# Patient Record
Sex: MALE
[De-identification: names, ages, dates, MRNs, and addresses within clinical notes are randomized per-mention and may not be internally consistent; named-entity substitution may affect disease eponyms.]

---

## 2022-06-17 ENCOUNTER — APPOINTMENT (OUTPATIENT)
Dept: ORTHOPEDIC SURGERY | Facility: CLINIC | Age: 47
End: 2022-06-17
Payer: COMMERCIAL

## 2022-06-17 VITALS — HEIGHT: 71 IN | BODY MASS INDEX: 28.42 KG/M2 | WEIGHT: 203 LBS

## 2022-06-17 DIAGNOSIS — M79.644 PAIN IN RIGHT FINGER(S): ICD-10-CM

## 2022-06-17 DIAGNOSIS — Z78.9 OTHER SPECIFIED HEALTH STATUS: ICD-10-CM

## 2022-06-17 PROBLEM — Z00.00 ENCOUNTER FOR PREVENTIVE HEALTH EXAMINATION: Status: ACTIVE | Noted: 2022-06-17

## 2022-06-17 PROCEDURE — 73140 X-RAY EXAM OF FINGER(S): CPT | Mod: RT

## 2022-06-17 PROCEDURE — 99203 OFFICE O/P NEW LOW 30 MIN: CPT

## 2022-06-17 NOTE — PHYSICAL EXAM
[MCP Joint] : MCP joint [2nd] : 2nd [Finger Flexion] : finger flexion [Right] : right fingers [de-identified] : tenderness over index RCL origin  [FreeTextEntry9] : minimal degenerative changes

## 2022-06-17 NOTE — HISTORY OF PRESENT ILLNESS
[Sudden] : sudden [0] : 0 [Sharp] : sharp [Occasional] : occasional [de-identified] : 46 year old male presents with a RIGHt hand index finger MP pain. Pt was picking up bags of dirt 2 weeks ago and started feeling pain in the hand the next day. But no specific injury occurred.  [] : no [FreeTextEntry1] : right index finger  [FreeTextEntry3] : 6/1/22  [FreeTextEntry5] : Patient was injured while picking up a bag of dirt. Having pain if he bends finger back

## 2022-07-08 ENCOUNTER — APPOINTMENT (OUTPATIENT)
Dept: ORTHOPEDIC SURGERY | Facility: CLINIC | Age: 47
End: 2022-07-08

## 2022-07-08 VITALS — HEIGHT: 71 IN | WEIGHT: 202 LBS | BODY MASS INDEX: 28.28 KG/M2

## 2022-07-08 DIAGNOSIS — S63.650A SPRAIN OF METACARPOPHALANGEAL JOINT OF RIGHT INDEX FINGER, INITIAL ENCOUNTER: ICD-10-CM

## 2022-07-08 PROCEDURE — 99213 OFFICE O/P EST LOW 20 MIN: CPT

## 2022-07-08 NOTE — PHYSICAL EXAM
[Right] : right hand [FreeTextEntry3] : Decreases swelling and tenderness [FreeTextEntry9] : near full fist

## 2022-07-08 NOTE — HISTORY OF PRESENT ILLNESS
[3] : 3 [0] : 0 [Dull/Aching] : dull/aching [Sharp] : sharp [Intermittent] : intermittent [de-identified] : 46 year old mlae followed for RIGHT index MP synovitis.  Has been using NSAIDs, strap, and warm compresses.  Reports improvement , but not resolution.\par DOI: 6/1/22  [FreeTextEntry1] : RT hand [] : no

## 2022-07-29 ENCOUNTER — APPOINTMENT (OUTPATIENT)
Dept: ORTHOPEDIC SURGERY | Facility: CLINIC | Age: 47
End: 2022-07-29

## 2022-07-29 VITALS — HEIGHT: 71 IN | WEIGHT: 202 LBS | BODY MASS INDEX: 28.28 KG/M2

## 2022-07-29 PROCEDURE — 99213 OFFICE O/P EST LOW 20 MIN: CPT

## 2022-07-29 NOTE — PHYSICAL EXAM
[Right] : right hand [] : no instability w/varus/valgus or ant/post stressing of fingers joints [FreeTextEntry9] : all fingers reach DPC, full fist

## 2022-07-29 NOTE — HISTORY OF PRESENT ILLNESS
[2] : 2 [0] : 0 [Dull/Aching] : dull/aching [Sharp] : sharp [Intermittent] : intermittent [de-identified] : 46 year old male being followed for Sprain of metacarpophalangeal (MCP) joint of right index finger and Synovitis of finger. Has been aniya strapping. Is OOW. Reports improvement in his symptoms. \par DOI: 6/1/22  [] : no [FreeTextEntry1] : RT hand

## 2022-09-02 ENCOUNTER — APPOINTMENT (OUTPATIENT)
Dept: ORTHOPEDIC SURGERY | Facility: CLINIC | Age: 47
End: 2022-09-02

## 2022-09-02 VITALS — HEIGHT: 71 IN | WEIGHT: 202 LBS | BODY MASS INDEX: 28.28 KG/M2

## 2022-09-02 DIAGNOSIS — S63.650D SPRAIN OF METACARPOPHALANGEAL JOINT OF RIGHT INDEX FINGER, SUBSEQUENT ENCOUNTER: ICD-10-CM

## 2022-09-02 DIAGNOSIS — M65.9 SYNOVITIS AND TENOSYNOVITIS, UNSPECIFIED: ICD-10-CM

## 2022-09-02 PROCEDURE — 99213 OFFICE O/P EST LOW 20 MIN: CPT

## 2022-09-02 NOTE — HISTORY OF PRESENT ILLNESS
[0] : 0 [de-identified] : 46 year old male being followed for Sprain of metacarpophalangeal (MCP) joint of right index finger and Synovitis of finger. Reporting 90% improvement. Is OOW. \par DOI: 6/1/22  [FreeTextEntry1] : right hand  [FreeTextEntry5] : p

## 2022-09-02 NOTE — PHYSICAL EXAM
[Right] : right hand [] : no instability w/varus/valgus or ant/post stressing of fingers joints [FreeTextEntry3] : decreased tenderness over radial aspect of index Metacarpal